# Patient Record
Sex: FEMALE | Race: ASIAN | NOT HISPANIC OR LATINO | URBAN - METROPOLITAN AREA
[De-identification: names, ages, dates, MRNs, and addresses within clinical notes are randomized per-mention and may not be internally consistent; named-entity substitution may affect disease eponyms.]

---
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
## 2021-07-07 NOTE — ED PEDIATRIC NURSE NOTE - DISCHARGE DATE/TIME
Alert-The patient is alert, awake and responds to voice. The patient is oriented to time, place, and person. The triage nurse is able to obtain subjective information.
01-Oct-2018 00:00

## 2023-03-24 NOTE — ED PEDIATRIC NURSE NOTE - MUSCULOSKELETAL WDL
Pt here for PAT visit.  Pre-op tests completed, chg soap given, and instructions reviewed.  Instructed nothing to eat/drink after MN night prior, voiced understanding.  
Full range of motion of upper and lower extremities, no joint tenderness/swelling.

## 2023-06-12 NOTE — ED PEDIATRIC TRIAGE NOTE - BP NONINVASIVE SYSTOLIC (MM HG)
Is This A New Presentation, Or A Follow-Up?: Skin Lesion What Type Of Note Output Would You Prefer (Optional)?: Bullet Format How Severe Is Your Skin Lesion?: moderate Has Your Skin Lesion Been Treated?: not been treated 117